# Patient Record
(demographics unavailable — no encounter records)

---

## 2025-07-28 NOTE — REASON FOR VISIT
[Consultation] : a consultation visit [Family Member] : family member [FreeTextEntry1] : heartburn, colon cancer screening h/o colon polyps

## 2025-07-28 NOTE — ASSESSMENT
[FreeTextEntry1] : Patient is a 56 year female, with PMH HLD, who presents for colon cancer screening and heartburn.   If EGD shows significant esophagitis, will start PPI, in the meantime, continue TUMS prn - EGD to be scheduled to r/o gastritis, esophagitis, Espinoza's Esophagus, or PUD. Indication, risks and benefit of EGD discussed with patient in detail. All questions answered. Patient is medically optimized for EGD.  - Colonoscopy to be scheduled. I have discussed the indications, risks and benefits of procedure with patient. Risks include, but not limited to, bleeding, perforation, infection, and reaction to anesthesia. Alternatives to colonoscopy discussed with patient. Patient was given the opportunity to ask questions, all questions were answered. The patient agrees to proceed with colonoscopy. Patient is medically optimized for colonoscopy.  - Suprep to be used. Bowel prep instructions discussed at length.

## 2025-07-28 NOTE — HISTORY OF PRESENT ILLNESS
[FreeTextEntry1] : Patient is a 56 year female, with PMH HLD, who presents for colon cancer screening.   Patient had a colonoscopy 6 years ago with Dr. Moe which was positive for polyps, per pt, and she was recommended to repeat in 5 years. Patient denies a family h/o colon polyps or colon cancer.   Patient also has epigastric pain and chronic heartburn 2-4x/week, takes TUMS prn with good relief. She had an EGD 6 years ago as well and was told she had esophagitis. We do not have records of previous procedures to review.   Patient denies dysphagia, change in BMs, rectal bleeding, nausea, vomiting, or unexplained weight loss.   Patient denies any significant cardiac or pulmonary conditions.

## 2025-07-28 NOTE — PHYSICAL EXAM
[Alert] : alert [Normal Voice/Communication] : normal voice/communication [Healthy Appearing] : healthy appearing [No Acute Distress] : no acute distress [Obese (BMI >= 30)] : obese (BMI >= 30) [Sclera] : the sclera and conjunctiva were normal [Hearing Threshold Finger Rub Not [place]] : hearing was normal [Normal Lips/Gums] : the lips and gums were normal [Oropharynx] : the oropharynx was normal [Normal Appearance] : the appearance of the neck was normal [No Neck Mass] : no neck mass was observed [No Respiratory Distress] : no respiratory distress [No Acc Muscle Use] : no accessory muscle use [Abdomen Tenderness] : non-tender [No Masses] : no abdominal mass palpated [Abdomen Soft] : soft [] : no hepatosplenomegaly [Oriented To Time, Place, And Person] : oriented to person, place, and time